# Patient Record
Sex: FEMALE | Race: WHITE | Employment: FULL TIME | URBAN - METROPOLITAN AREA
[De-identification: names, ages, dates, MRNs, and addresses within clinical notes are randomized per-mention and may not be internally consistent; named-entity substitution may affect disease eponyms.]

---

## 2024-08-15 ENCOUNTER — OFFICE VISIT (OUTPATIENT)
Dept: DERMATOLOGY | Age: 44
End: 2024-08-15
Payer: COMMERCIAL

## 2024-08-15 DIAGNOSIS — D22.9 MULTIPLE NEVI: ICD-10-CM

## 2024-08-15 DIAGNOSIS — L81.1 MELASMA: Primary | ICD-10-CM

## 2024-08-15 DIAGNOSIS — D23.72 DERMATOFIBROMA OF LEFT LOWER LEG: ICD-10-CM

## 2024-08-15 PROCEDURE — 99213 OFFICE O/P EST LOW 20 MIN: CPT | Performed by: DERMATOLOGY

## 2024-08-15 RX ORDER — LEVONORGESTREL AND ETHINYL ESTRADIOL 6-5-10
1 KIT ORAL DAILY
COMMUNITY
Start: 2021-07-29

## 2024-08-15 NOTE — PROGRESS NOTES
sunscreen, and self skin examinations were encouraged.  Call for any new or concerning lesions.       3. Dermatofibroma of left lower leg     Reassurance.            --Alfonso Shi MD